# Patient Record
Sex: FEMALE | Race: WHITE | NOT HISPANIC OR LATINO | Employment: OTHER | URBAN - METROPOLITAN AREA
[De-identification: names, ages, dates, MRNs, and addresses within clinical notes are randomized per-mention and may not be internally consistent; named-entity substitution may affect disease eponyms.]

---

## 2018-06-12 ENCOUNTER — HOSPITAL ENCOUNTER (EMERGENCY)
Facility: HOSPITAL | Age: 48
Discharge: HOME | End: 2018-06-12
Attending: EMERGENCY MEDICINE
Payer: MEDICAID

## 2018-06-12 VITALS
DIASTOLIC BLOOD PRESSURE: 80 MMHG | TEMPERATURE: 99 F | HEART RATE: 80 BPM | RESPIRATION RATE: 18 BRPM | WEIGHT: 180 LBS | SYSTOLIC BLOOD PRESSURE: 133 MMHG | OXYGEN SATURATION: 97 % | BODY MASS INDEX: 28.25 KG/M2 | HEIGHT: 67 IN

## 2018-06-12 DIAGNOSIS — M54.2 NECK PAIN: ICD-10-CM

## 2018-06-12 DIAGNOSIS — R52 PAIN: ICD-10-CM

## 2018-06-12 DIAGNOSIS — M79.603 PAIN OF UPPER EXTREMITY, UNSPECIFIED LATERALITY: Primary | ICD-10-CM

## 2018-06-12 PROCEDURE — 93005 ELECTROCARDIOGRAM TRACING: CPT

## 2018-06-12 PROCEDURE — 63600175 PHARM REV CODE 636 W HCPCS: Performed by: NURSE PRACTITIONER

## 2018-06-12 PROCEDURE — 96372 THER/PROPH/DIAG INJ SC/IM: CPT

## 2018-06-12 PROCEDURE — 99284 EMERGENCY DEPT VISIT MOD MDM: CPT | Mod: 25

## 2018-06-12 PROCEDURE — 25000003 PHARM REV CODE 250: Performed by: EMERGENCY MEDICINE

## 2018-06-12 RX ORDER — METHOCARBAMOL 500 MG/1
1000 TABLET, FILM COATED ORAL 3 TIMES DAILY
Qty: 30 TABLET | Refills: 0 | Status: SHIPPED | OUTPATIENT
Start: 2018-06-12 | End: 2018-06-17

## 2018-06-12 RX ORDER — KETOROLAC TROMETHAMINE 30 MG/ML
30 INJECTION, SOLUTION INTRAMUSCULAR; INTRAVENOUS
Status: COMPLETED | OUTPATIENT
Start: 2018-06-12 | End: 2018-06-12

## 2018-06-12 RX ORDER — NAPROXEN 500 MG/1
500 TABLET ORAL 2 TIMES DAILY WITH MEALS
Qty: 10 TABLET | Refills: 0 | Status: SHIPPED | OUTPATIENT
Start: 2018-06-12

## 2018-06-12 RX ORDER — DIAZEPAM 5 MG/1
5 TABLET ORAL
Status: COMPLETED | OUTPATIENT
Start: 2018-06-12 | End: 2018-06-12

## 2018-06-12 RX ADMIN — DIAZEPAM 5 MG: 5 TABLET ORAL at 03:06

## 2018-06-12 RX ADMIN — KETOROLAC TROMETHAMINE 30 MG: 30 INJECTION, SOLUTION INTRAMUSCULAR at 02:06

## 2018-06-12 NOTE — ED PROVIDER NOTES
"Encounter Date: 6/12/2018       History     Chief Complaint   Patient presents with    Arm Pain     pt presents to ED today c/o pain to right arm after working in yard yesterday. she reports she was using large venkatesh and heard a "snapping" noise from right arm. pt unable to life right arm due to pain. no noted deformity      Pt is a 48-year-old female with pmhx of migraine headaches who presents to ED with R neck, shoulder, and arm pain after gardening yesterday. Pt is R -handed. She reports that she was using venkatesh while gardening yesterday and heard a "snap" in her R arm. Pt associates numbness and tingling down her R arm. Pt denies CP and SOB. Pt reports that her R arm feels "achy" now. Pt denies fever, chills, neck stiffness, blurry vision, dizziness, headache, N/V/D, abdominal pain, and dysuria. Pt denies any other complaints at this time.       The history is provided by the patient.     Review of patient's allergies indicates:   Allergen Reactions    Sulfa (sulfonamide antibiotics) Anaphylaxis    Asa [aspirin] Nausea And Vomiting    Eggs [egg derived] Nausea And Vomiting    Betadine [povidone-iodine] Rash     Past Medical History:   Diagnosis Date    H/O total hysterectomy     History of tonsillectomy     Migraine headache      History reviewed. No pertinent surgical history.  History reviewed. No pertinent family history.  Social History   Substance Use Topics    Smoking status: Never Smoker    Smokeless tobacco: Never Used    Alcohol use Yes      Comment: social drinker     Review of Systems   Constitutional: Negative for chills and fever.   HENT: Negative for sore throat.    Respiratory: Negative for shortness of breath.    Cardiovascular: Negative for chest pain.   Gastrointestinal: Negative for abdominal pain, diarrhea, nausea and vomiting.   Genitourinary: Negative for dysuria.   Musculoskeletal: Positive for neck pain. Negative for back pain, joint swelling and neck stiffness.   Skin: " Negative for rash.   Neurological: Positive for numbness. Negative for dizziness, syncope, speech difficulty, weakness, light-headedness and headaches.        Tingling   Hematological: Does not bruise/bleed easily.   Psychiatric/Behavioral: The patient is nervous/anxious.        Physical Exam     Initial Vitals [06/12/18 1325]   BP Pulse Resp Temp SpO2   (!) 147/75 98 18 99.2 °F (37.3 °C) 97 %      MAP       --         Physical Exam    Nursing note and vitals reviewed.  Constitutional: Vital signs are normal. She appears well-developed and well-nourished. She is not diaphoretic.  Non-toxic appearance. She does not have a sickly appearance. No distress.   HENT:   Head: Normocephalic and atraumatic.   Right Ear: External ear normal.   Left Ear: External ear normal.   Nose: Nose normal.   Eyes: Conjunctivae, EOM and lids are normal. Right eye exhibits no discharge. Left eye exhibits no discharge. No scleral icterus.   Neck: Trachea normal, normal range of motion, full passive range of motion without pain and phonation normal. Neck supple. Muscular tenderness present. No spinous process tenderness present. Normal range of motion present. No neck rigidity.   Cardiovascular: Normal rate, regular rhythm, normal heart sounds and intact distal pulses. Exam reveals no friction rub.    No murmur heard.  Pt denies any cardiac s/s.   Pulmonary/Chest: Effort normal and breath sounds normal. No respiratory distress. She has no wheezes. She has no rhonchi. She has no rales.   Musculoskeletal: She exhibits no edema.        Right upper arm: She exhibits tenderness. She exhibits no bony tenderness, no swelling, no edema, no deformity and no laceration.   TTP to R shoulder and humerus. Small bruise noted on R humerus. No bony tenderness or deformity. Pain with flexion, extension, abduction, and adduction. Pt NVI.    Neurological: She is alert and oriented to person, place, and time. No cranial nerve deficit or sensory deficit. Gait  normal. GCS eye subscore is 4. GCS verbal subscore is 5. GCS motor subscore is 6.   Skin: Skin is warm, dry and intact. Capillary refill takes less than 2 seconds. No rash noted.   Psychiatric: Her mood appears anxious.         ED Course   Procedures  Labs Reviewed - No data to display     Imaging Results          X-Ray Shoulder Trauma Right (Final result)  Result time 06/12/18 14:33:39    Final result by Elias Mejia MD (06/12/18 14:33:39)                 Impression:      1. No acute displaced fracture or dislocation of the right shoulder.      Electronically signed by: Elias Mejia MD  Date:    06/12/2018  Time:    14:33             Narrative:    EXAMINATION:  XR SHOULDER TRAUMA 3 VIEW RIGHT    CLINICAL HISTORY:  Pain, unspecified    TECHNIQUE:  Three or four views of the right shoulder were performed.    COMPARISON:  None    FINDINGS:  Three views.    No acute displaced fracture or dislocation of the right shoulder.  The acromioclavicular joint is intact.  The visualized right lung zones are grossly clear.  No acute displaced right rib fracture.                               X-Ray Humerus 2 View Right (Final result)  Result time 06/12/18 14:34:12    Final result by Elias Mejia MD (06/12/18 14:34:12)                 Impression:      1. No acute displaced fracture or dislocation of the right humerus.      Electronically signed by: Elias Mejia MD  Date:    06/12/2018  Time:    14:34             Narrative:    EXAMINATION:  XR HUMERUS 2 VIEW RIGHT    CLINICAL HISTORY:  Pain, unspecified    COMPARISON:  None    FINDINGS:  Two views right humerus.    No acute displaced fracture or dislocation of the humerus.  No radiopaque foreign body.  No acute displaced right rib fracture.  The right lung zones are grossly clear.                                X-Ray Shoulder Trauma Right    (Results Pending)   X-Ray Humerus 2 View Right    (Results Pending)        Medical Decision Making:   Initial Assessment:  "  Pt presents to ED with R neck, shoulder, and humerus pain since yesterday. Pt appears anxious and non-toxic. 2+ radial pulses bilaterally by palpation. Sensation and strength intact bilaterally in upper extremities. Pt NVI.   Differential Diagnosis:   Sprain/strain, fracture, dislocation   Clinical Tests:   Radiological Study: Ordered and Reviewed  ED Management:  Xray humerus, Xray shoulder, 30 mg IM toradol, 5 mg valium   Xrays normal. EKG normal. Pt reports that she "feels better" after toradol. Pt is stable, continues to deny cardiac s/s and will be d/c home. Pt given prescriptions for robaxin and naproxen. Pt instructed not to drink alcohol, drive, or operate machinery while taking robaxin. Pt instructed to f/u with ortho within 2-3 days for a possible MRI on shoulder if the pain continues. Return precautions given.    Rx: naproxen, robaxin               Attending Attestation:     Physician Attestation Statement for NP/PA:   I have conducted a face to face encounter with this patient in addition to the NP/PA, due to NP/PA Request    Other NP/PA Attestation Additions:      Medical Decision Making: Patient here with right arm pain with ROM after gardening yesterday.  States that pain began after using venkatesh. VSS, NAD, nontoxic appearing,  Right shoulder ROM restricted secondary to pain.  RUE NVI.  No obvious deformity, mild TTP to right shoulder, no edema.  Suspect muscle strain.  Recommended symptomatic treatment and follow up with PCP if pain persists.  Patient may need MRI or further imaging in future                 ED Course as of Jun 12 1447   Tue Jun 12, 2018   1414 EKG: NSR with 86 bpm, no TWIs, normal intervals, normal conduction, no STEMI  [LD]      ED Course User Index  [LD] Lisbeth Giraldo MD     Clinical Impression:   The primary encounter diagnosis was Pain of upper extremity, unspecified laterality. Diagnoses of Neck pain and Pain were also pertinent to this visit.                           "   Walter Nur NP  06/12/18 1544       Lisbeth Giraldo MD  06/13/18 1142

## 2021-12-28 NOTE — DISCHARGE INSTRUCTIONS
Please take prescribed medications and use R.I.C.E. Please do not drive, drink alcohol, or operate machinery while taking robaxin. Follow-up with orthopedics within 2-3 days for possible MRI. Return to ED if symptoms worsen or change.  
No

## 2023-08-02 DIAGNOSIS — S52.125D: Primary | ICD-10-CM

## 2023-08-02 DIAGNOSIS — M77.12 LATERAL EPICONDYLITIS OF LEFT ELBOW: ICD-10-CM

## 2023-08-08 ENCOUNTER — CLINICAL SUPPORT (OUTPATIENT)
Dept: REHABILITATION | Facility: HOSPITAL | Age: 53
End: 2023-08-08
Payer: MEDICAID

## 2023-08-08 DIAGNOSIS — R53.1 WEAKNESS: ICD-10-CM

## 2023-08-08 DIAGNOSIS — M79.602 PAIN OF LEFT UPPER EXTREMITY: ICD-10-CM

## 2023-08-08 DIAGNOSIS — Z74.09 STIFFNESS DUE TO IMMOBILITY: ICD-10-CM

## 2023-08-08 DIAGNOSIS — M25.60 STIFFNESS DUE TO IMMOBILITY: ICD-10-CM

## 2023-08-08 PROCEDURE — 97165 OT EVAL LOW COMPLEX 30 MIN: CPT | Mod: PN

## 2023-08-08 NOTE — PATIENT INSTRUCTIONS
"  [facility]  OCCUPATIONAL THERAPY  HOME EXERCISE PROGRAM     Complete the following exercises for 2/15 repetitions, 1-2x/day:     AROM: Elbow Flexion / Extension        Bend and straighten elbow in 3 different positions:   thumb up, palm up, palm down.      AROM: Supination / Pronation   With your elbow by your side, turn your   palm up then turn your palm down.      AROM: Wrist Flexion / Extension               Bend your wrist forward and back as far as possible.          AROM: Wrist Radial / Ulnar Deviation  Bend your wrist from side to side as far as possible.  AROM: Isolated MCP Flexion / Extension ("Wave")   Bend only your large, bottom knuckles. Hold 3 seconds.   Keep the tips of your fingers straight. Straighten fingers.      AROM: Isolated IPJ Flexion / Extension ("Hook")   Bend only your middle and end knuckles. Hold 3 seconds.   Straighten your fingers.       AROM: MCP and PIP Flexion / Extension ("Straight Fist")  Bend your bottom and middle knuckles, keeping the tips of your fingers straight.   Try to touch the pads of your fingers on your palm. Hold 3 seconds.   Straighten your fingers.       AROM: Composite Flexion / Extension ("Full Fist")  Bend every joint in your hand into a fist. Hold 3 seconds.   Straighten your fingers.               "
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
## 2023-08-14 NOTE — PROGRESS NOTES
"[facility]  Occupational Therapy Treatment Note     Date: 8/15/2023  Name: Sofia West  Clinic Number: [id number]    Therapy Diagnosis:   Encounter Diagnoses   Name Primary?    Pain of left upper extremity Yes    Weakness     Stiffness due to immobility      Physician: Marianne Williamson PA  Physician Orders: Eval and Treat  Medical Diagnosis:   S52.125D (ICD-10-CM) - Traumatic closed nondisplaced fracture of head of radius, left, with routine healing, subsequent encounter   M77.12 (ICD-10-CM) - Lateral epicondylitis of left elbow   Date of Injury: 5/15/2023  Evaluation Date: 8/8/2023  Insurance Authorization Period Expiration: 12/31/2023  Plan of Care Certification Period: 11/3/2023  Date of Return to MD: 9/5/2023  Visit # / Visits authorized: 2/ 1  FOTO: 1/3     Precautions:  Standard     Time In: 11:10  Time Out: 11:55  Total Appointment Time (timed & untimed codes): 45 minutes    Subjective     Pt reports: "I felt a pop in this elbow and feel like my muscles are spasming."  she was compliant with home exercise program given last session.   Response to previous treatment:decreased pain  Functional change: tolerated progression of treatment well    Pain: 2/10  Location: left elbow        Objective     Objective Measures updated at progress report unless specified.   Observation/Appearance: pt to clinic with boot on right foot     Sensation Test: within normal limits      Skin Condition/Scarring: Intact     Edema. Measured in centimeters.    8/8/2023 8/8/2023     Left Right   Elbow 25.5 25.0   Wrist Crease 16 16      Range of Motion/Strength:  Elbow and Wrist ROM. Measured in degrees.    8/8/2023 8/8/2023         Left Right       Elbow Extension/Flexion -22/122*pain 0/140       Supination/Pronation 70*pain/90 70/90       Wrist Ext/Flex 70/70 84/70       Wrist RD/UD 20/34*pain 20/40           Strength (Dynamometer Rung II) and Pinch Strength (Pinch Gauge)  Measured in pounds.    " "8/8/2023 8/8/2023     Left Right   Elbow Flexed 45 65   Elbow Extended 35 60   Key Pinch 7 9   3pt Pinch 6 9   2pt Pinch 4 6      Special Tests: left              - Cozen's (lateral epicondylitis) test: +              - Mill's (lateral epicondylitis) test: +              - Extensor Digitorum (lateral epicondylitis) test: +              - Medial epicondylitis test: -     Intake Outcome Measure for FOTO Elbow Survey     Therapist reviewed FOTO scores for Sofia West on 8/8/2023.   FOTO documents entered into [facility] - see Media section.     Intake Score: 58%            Treatment     Sofia received the treatments listed below:     Supervised modalities after being cleared for contradictions: Hot Pack - to left elbow for pain management and tissue extensibility x 5 minutes    Manual therapy techniques: Soft tissue Mobilization were applied to the: Left elbow, wrist and hand for 10 minutes, including:  -ASTYM    Therapeutic exercises to develop ROM for 15 minutes, including:  -scifit UBE forward/reverse 3 minutes each direction  -ASTYM stretches 1-3 3/30"  -elbow 3 ways 2/15  -pronation/supination 2/15  -wrist flexion/extension 2/15  -radial/ulnar deviation 2/15    Therapeutic activities to improve functional performance for 15  minutes, including:  -pom pom pickup green clothespin 1 container  -pom pom pickup hand gripper 2nd rung 1 container  -green putty strengthening home exercise program       Patient Education and Home Exercises     Education provided:   - on current condition and home exercise program   - Progress towards goals     Written Home Exercises Provided: Patient instructed to cont prior HEP.  Exercises were reviewed and Sofia was able to demonstrate them prior to the end of the session.  Sofia demonstrated good  understanding of the HEP provided.     See EMR under Patient Instructions for exercises provided prior visit.      Assessment     Pt with good participation this date. Pain report low " throughout session. Pt with some signs and symptoms if fibrotic tissue noted during ASTYM however responded well to treatment. Pt able to complete all exercises in a pain free range of motion and without c/o. Pt motivated.     Sofia is progressing well towards her goals and there are no updates to goals at this time. Pt prognosis is Good.     Pt will continue to benefit from skilled outpatient occupational therapy to address the deficits listed in the problem list on initial evaluation provide pt/family education and to maximize pt's level of independence in the home and community environment.     Anticipated barriers to occupational therapy: none    Pt's spiritual, cultural and educational needs considered and pt agreeable to plan of care and goals.    Goals:     Short term Goals:  1) Initiate home exercise program -met, ongoing  2) Pt will increase AROM of Left elbow by 10 degrees in order to assist with functional task by 4 weeks. -Not met, progressing  3) Pt will reduce edema by .5cm in affected elbow by 4 weeks. -Not met, progressing  4) Pt will reduce pain to less than 5/10 by 4 weeks. -Not met, progressing  5) Pt will increase functional  strength by 5# in order to A in opening containers for med management or home management tasks by 4 weeks. -Not met, progressing  6) Patient will be able to achieve less than or equal to 45% on the FOTO, demonstrating overall improved functional ability with upper extremity. (Self-care category) -Not met, progressing     Long Term Goals:  Goals to be met by discharge:  1) Independent with home exercise program --Not met, progressing  2) Pt will increase left elbow and wrist range of motion to within functional limits in order to increase functional use for grasp with home management or work related tasks by d/c. -Not met, progressing  3) Pt will increase  a strength by 10 pound for improved performance with activities of daily living's and IADL's  -Not met,  progressing  4) Pt will decrease pain to trace or none while completing light home management tasks or work related tasks by d/c. -Not met, progressing  5) Patient will be able to achieve less than or equal to 25% on the FOTO, demonstrating overall improved functional ability with upper extremity.  (Self-care category) -Not met, progressing       Plan     Continue with OT plan of care   Updates/Grading for next session: progress as able, awaiting PT orders for ROZ Strickland

## 2023-08-15 ENCOUNTER — CLINICAL SUPPORT (OUTPATIENT)
Dept: REHABILITATION | Facility: HOSPITAL | Age: 53
End: 2023-08-15
Payer: MEDICAID

## 2023-08-15 DIAGNOSIS — S93.401D SPRAIN OF LIGAMENT OF RIGHT ANKLE, SUBSEQUENT ENCOUNTER: Primary | ICD-10-CM

## 2023-08-15 DIAGNOSIS — M79.602 PAIN OF LEFT UPPER EXTREMITY: Primary | ICD-10-CM

## 2023-08-15 DIAGNOSIS — M77.12 LATERAL EPICONDYLITIS OF LEFT ELBOW: ICD-10-CM

## 2023-08-15 DIAGNOSIS — M25.60 STIFFNESS DUE TO IMMOBILITY: ICD-10-CM

## 2023-08-15 DIAGNOSIS — R53.1 WEAKNESS: ICD-10-CM

## 2023-08-15 DIAGNOSIS — Z74.09 STIFFNESS DUE TO IMMOBILITY: ICD-10-CM

## 2023-08-15 PROCEDURE — 97530 THERAPEUTIC ACTIVITIES: CPT | Mod: PN

## 2023-08-15 NOTE — PATIENT INSTRUCTIONS
[facility]  OCCUPATIONAL THERAPY  HOME EXERCISE PROGRAM     Complete the following strengthening program 1x/day.                 _